# Patient Record
Sex: FEMALE | URBAN - METROPOLITAN AREA
[De-identification: names, ages, dates, MRNs, and addresses within clinical notes are randomized per-mention and may not be internally consistent; named-entity substitution may affect disease eponyms.]

---

## 2017-11-28 NOTE — PATIENT DISCUSSION
I explained to the patient that they have a branch retinal vein occlusion, which is a blockage in one of the small veins in the back of the eye. Not involving center of vision.

## 2020-12-31 ENCOUNTER — IMPORTED ENCOUNTER (OUTPATIENT)
Dept: URBAN - METROPOLITAN AREA CLINIC 9 | Facility: CLINIC | Age: 65
End: 2020-12-31

## 2021-10-16 ASSESSMENT — TONOMETRY
OS_IOP_MMHG: 11
OD_IOP_MMHG: 10

## 2021-10-16 ASSESSMENT — VISUAL ACUITY
OS_SC: 20/60 - SN
OD_SC: 20/30 SN

## 2022-07-07 RX ORDER — HYDROCHLOROTHIAZIDE 12.5 MG/1
TABLET ORAL
COMMUNITY
Start: 2022-03-29

## 2022-07-07 RX ORDER — LOSARTAN POTASSIUM 100 MG/1
TABLET ORAL
COMMUNITY

## 2022-08-19 PROBLEM — E78.5 HYPERLIPIDEMIA: Status: ACTIVE | Noted: 2022-08-19

## 2022-08-19 PROBLEM — E83.42 HYPOMAGNESEMIA: Status: ACTIVE | Noted: 2022-08-19

## 2022-08-19 PROBLEM — M25.519 SHOULDER PAIN: Status: ACTIVE | Noted: 2022-08-19

## 2022-08-19 PROBLEM — N20.0 RENAL CALCULI: Status: ACTIVE | Noted: 2022-08-19

## 2022-08-19 PROBLEM — I10 ESSENTIAL HYPERTENSION: Status: ACTIVE | Noted: 2022-08-19

## 2022-08-19 PROBLEM — M19.90 ACUTE ARTHRITIS: Status: ACTIVE | Noted: 2022-08-19

## 2022-08-19 PROBLEM — M75.100 ROTATOR CUFF SYNDROME: Status: ACTIVE | Noted: 2022-08-19

## 2022-08-19 PROBLEM — E55.9 VITAMIN D DEFICIENCY: Status: ACTIVE | Noted: 2022-08-19

## 2022-08-19 PROBLEM — M25.50 POLYARTHRALGIA: Status: ACTIVE | Noted: 2022-08-19

## 2022-08-19 PROBLEM — G62.9 NEUROPATHY: Status: ACTIVE | Noted: 2022-08-19

## 2022-08-19 PROBLEM — M75.02 ADHESIVE CAPSULITIS OF LEFT SHOULDER: Status: ACTIVE | Noted: 2022-08-19

## 2022-08-19 PROBLEM — M21.371 FOOT DROP, RIGHT: Status: ACTIVE | Noted: 2022-08-19

## 2022-08-19 PROBLEM — R29.6 FREQUENT FALLS: Status: ACTIVE | Noted: 2022-08-19

## 2022-08-19 PROBLEM — L98.491 CHRONIC SKIN ULCER, LIMITED TO BREAKDOWN OF SKIN (HCC): Status: ACTIVE | Noted: 2022-08-19

## 2022-08-19 PROBLEM — E11.9 TYPE 2 DIABETES MELLITUS WITHOUT COMPLICATION, WITHOUT LONG-TERM CURRENT USE OF INSULIN (HCC): Status: ACTIVE | Noted: 2022-08-19

## 2022-08-19 PROBLEM — G61.81 CHRONIC INFLAMMATORY DEMYELINATING POLYNEUROPATHY (HCC): Status: ACTIVE | Noted: 2022-08-19

## 2022-08-19 PROBLEM — N39.0 URINARY TRACT INFECTIOUS DISEASE: Status: ACTIVE | Noted: 2022-08-19

## 2022-08-19 PROBLEM — E66.01 MORBID OBESITY (HCC): Status: ACTIVE | Noted: 2022-08-19

## 2022-08-19 PROBLEM — M75.41 IMPINGEMENT SYNDROME OF RIGHT SHOULDER: Status: ACTIVE | Noted: 2022-08-19

## 2022-10-06 PROBLEM — M25.519 SHOULDER PAIN: Status: RESOLVED | Noted: 2022-08-19 | Resolved: 2022-10-06

## 2022-10-06 PROBLEM — M19.90 OSTEOARTHROSIS: Status: ACTIVE | Noted: 2022-10-06

## 2022-10-06 PROBLEM — M19.90 ACUTE ARTHRITIS: Status: RESOLVED | Noted: 2022-08-19 | Resolved: 2022-10-06

## 2022-10-06 PROBLEM — G62.9 PERIPHERAL NERVE DISEASE: Status: ACTIVE | Noted: 2022-10-06

## 2022-10-06 PROBLEM — E66.9 OBESITY: Status: ACTIVE | Noted: 2022-10-06

## 2022-10-06 PROBLEM — I10 HYPERTENSIVE DISORDER: Status: RESOLVED | Noted: 2022-10-06 | Resolved: 2022-10-06

## 2022-10-06 PROBLEM — I10 HYPERTENSIVE DISORDER: Status: ACTIVE | Noted: 2022-10-06

## 2022-10-06 PROBLEM — E11.9 DIABETES MELLITUS (HCC): Status: ACTIVE | Noted: 2022-10-06
